# Patient Record
Sex: FEMALE | Race: WHITE
[De-identification: names, ages, dates, MRNs, and addresses within clinical notes are randomized per-mention and may not be internally consistent; named-entity substitution may affect disease eponyms.]

---

## 2017-01-06 ENCOUNTER — HOSPITAL ENCOUNTER (OUTPATIENT)
Dept: HOSPITAL 80 - BMCIMAGING | Age: 42
End: 2017-01-06
Attending: PHYSICIAN ASSISTANT
Payer: COMMERCIAL

## 2017-01-06 DIAGNOSIS — S62.512A: Primary | ICD-10-CM

## 2017-01-06 NOTE — DX
Left thumb series 3 views 0831 hours.

 

History: Skiing injury on December 30, 2016. Persistent pain.

 

Findings: There is oblique fracture at the base of the left thumb proximal phalanx at the MCP joint a
long the medial ulnar margin compatible with osseous avulsion of the ulnar collateral ligament. No ad
ditional fractures are appreciated. Joint spaces are otherwise normal.

 

Impression:

1. Fracture base of the left thumb proximal phalanx compatible with osseous avulsion of the ulnar col
lateral ligament.

## 2017-01-17 ENCOUNTER — HOSPITAL ENCOUNTER (OUTPATIENT)
Dept: HOSPITAL 80 - BMCIMAGING | Age: 42
End: 2017-01-17
Attending: PHYSICIAN ASSISTANT
Payer: COMMERCIAL

## 2017-01-17 DIAGNOSIS — S62.512D: Primary | ICD-10-CM

## 2017-01-17 NOTE — DX
LEFT THUMB,, Three Views



History: Follow-up fractured base of left thumb, date of injury 12/30/2016, S69.92XA



Comparison: January 6, 2017



Findings: There is no significant change in alignment of the ulnar collateral ligament avulsion injur
y at the base of the proximal phalanx of the thumb. Fracture margins are indistinct consistent with e
tiffany healing. There is no periosteal reaction or radiopaque callus. Alignment of the first metacarpal
 phalangeal joint remains normal.



Impression: Early healing is occurring.

## 2017-02-01 ENCOUNTER — HOSPITAL ENCOUNTER (OUTPATIENT)
Dept: HOSPITAL 80 - BMCIMAGING | Age: 42
End: 2017-02-01
Attending: PHYSICIAN ASSISTANT
Payer: COMMERCIAL

## 2017-02-01 DIAGNOSIS — S62.512A: Primary | ICD-10-CM

## 2017-02-01 NOTE — DX
Left First Finger, Three Views 



Indication: Follow up fracture.



Comparison:  Left thumb series dated 1/6 and 1/17/2017



Findings: The 4 x 2 mm chip fracture avulsed off the ulnar base of the proximal phalanx of the thumb 
is unchanged in position since January 2017. No significant bony resorption or callus deposition has 
developed. The bones are anatomically aligned.



Impression: Avulsion chip fracture involving the ulnar collateral ligament at the base of the thumb i
s unchanged in alignment since 4 weeks prior. No significant healing callus has developed.

## 2017-02-28 ENCOUNTER — HOSPITAL ENCOUNTER (OUTPATIENT)
Dept: HOSPITAL 80 - BMCIMAGING | Age: 42
End: 2017-02-28
Attending: PHYSICIAN ASSISTANT
Payer: COMMERCIAL

## 2017-02-28 DIAGNOSIS — S62.515D: Primary | ICD-10-CM

## 2018-01-12 ENCOUNTER — HOSPITAL ENCOUNTER (OUTPATIENT)
Dept: HOSPITAL 80 - FIMAGING | Age: 43
End: 2018-01-12
Attending: INTERNAL MEDICINE
Payer: COMMERCIAL

## 2018-01-12 ENCOUNTER — HOSPITAL ENCOUNTER (OUTPATIENT)
Dept: HOSPITAL 80 - FIMAGING | Age: 43
End: 2018-01-12
Attending: FAMILY MEDICINE
Payer: COMMERCIAL

## 2018-01-12 DIAGNOSIS — Z12.31: Primary | ICD-10-CM

## 2018-01-12 DIAGNOSIS — Z80.3: ICD-10-CM

## 2018-01-12 DIAGNOSIS — R06.09: Primary | ICD-10-CM

## 2018-02-01 ENCOUNTER — HOSPITAL ENCOUNTER (OUTPATIENT)
Dept: HOSPITAL 80 - FIMAGING | Age: 43
End: 2018-02-01
Attending: NEUROLOGICAL SURGERY
Payer: COMMERCIAL

## 2018-02-01 DIAGNOSIS — M46.97: ICD-10-CM

## 2018-02-01 DIAGNOSIS — M46.96: ICD-10-CM

## 2018-02-01 DIAGNOSIS — M54.2: Primary | ICD-10-CM

## 2018-02-17 ENCOUNTER — HOSPITAL ENCOUNTER (OUTPATIENT)
Dept: HOSPITAL 80 - FIMAGING | Age: 43
End: 2018-02-17
Attending: NEUROLOGICAL SURGERY
Payer: COMMERCIAL

## 2018-02-17 DIAGNOSIS — M51.36: Primary | ICD-10-CM

## 2018-02-17 DIAGNOSIS — M51.86: ICD-10-CM

## 2019-01-08 ENCOUNTER — HOSPITAL ENCOUNTER (OUTPATIENT)
Dept: HOSPITAL 80 - FIMAGING | Age: 44
End: 2019-01-08
Attending: INTERNAL MEDICINE
Payer: COMMERCIAL

## 2019-01-08 DIAGNOSIS — R10.10: Primary | ICD-10-CM
